# Patient Record
Sex: MALE | Race: WHITE | NOT HISPANIC OR LATINO | URBAN - METROPOLITAN AREA
[De-identification: names, ages, dates, MRNs, and addresses within clinical notes are randomized per-mention and may not be internally consistent; named-entity substitution may affect disease eponyms.]

---

## 2019-12-20 PROBLEM — Z00.00 ENCOUNTER FOR PREVENTIVE HEALTH EXAMINATION: Status: ACTIVE | Noted: 2019-12-20

## 2021-08-25 ENCOUNTER — OUTPATIENT (OUTPATIENT)
Dept: OUTPATIENT SERVICES | Facility: HOSPITAL | Age: 61
LOS: 1 days | Discharge: HOME | End: 2021-08-25
Payer: COMMERCIAL

## 2021-08-25 ENCOUNTER — RESULT REVIEW (OUTPATIENT)
Age: 61
End: 2021-08-25

## 2021-08-25 VITALS
OXYGEN SATURATION: 100 % | RESPIRATION RATE: 16 BRPM | DIASTOLIC BLOOD PRESSURE: 64 MMHG | HEIGHT: 70 IN | TEMPERATURE: 96 F | HEART RATE: 71 BPM | SYSTOLIC BLOOD PRESSURE: 110 MMHG | WEIGHT: 239.2 LBS

## 2021-08-25 DIAGNOSIS — Z98.890 OTHER SPECIFIED POSTPROCEDURAL STATES: Chronic | ICD-10-CM

## 2021-08-25 DIAGNOSIS — S76.111D STRAIN OF RIGHT QUADRICEPS MUSCLE, FASCIA AND TENDON, SUBSEQUENT ENCOUNTER: ICD-10-CM

## 2021-08-25 DIAGNOSIS — Z01.818 ENCOUNTER FOR OTHER PREPROCEDURAL EXAMINATION: ICD-10-CM

## 2021-08-25 LAB
A1C WITH ESTIMATED AVERAGE GLUCOSE RESULT: 7.4 % — HIGH (ref 4–5.6)
ALBUMIN SERPL ELPH-MCNC: 4.6 G/DL — SIGNIFICANT CHANGE UP (ref 3.5–5.2)
ALP SERPL-CCNC: 73 U/L — SIGNIFICANT CHANGE UP (ref 30–115)
ALT FLD-CCNC: 41 U/L — SIGNIFICANT CHANGE UP (ref 0–41)
ANION GAP SERPL CALC-SCNC: 14 MMOL/L — SIGNIFICANT CHANGE UP (ref 7–14)
APTT BLD: 30.5 SEC — SIGNIFICANT CHANGE UP (ref 27–39.2)
AST SERPL-CCNC: 33 U/L — SIGNIFICANT CHANGE UP (ref 0–41)
BASOPHILS # BLD AUTO: 0.06 K/UL — SIGNIFICANT CHANGE UP (ref 0–0.2)
BASOPHILS NFR BLD AUTO: 0.7 % — SIGNIFICANT CHANGE UP (ref 0–1)
BILIRUB SERPL-MCNC: 1.1 MG/DL — SIGNIFICANT CHANGE UP (ref 0.2–1.2)
BUN SERPL-MCNC: 19 MG/DL — SIGNIFICANT CHANGE UP (ref 10–20)
CALCIUM SERPL-MCNC: 9.4 MG/DL — SIGNIFICANT CHANGE UP (ref 8.5–10.1)
CHLORIDE SERPL-SCNC: 101 MMOL/L — SIGNIFICANT CHANGE UP (ref 98–110)
CO2 SERPL-SCNC: 22 MMOL/L — SIGNIFICANT CHANGE UP (ref 17–32)
CREAT SERPL-MCNC: 0.8 MG/DL — SIGNIFICANT CHANGE UP (ref 0.7–1.5)
EOSINOPHIL # BLD AUTO: 0.61 K/UL — SIGNIFICANT CHANGE UP (ref 0–0.7)
EOSINOPHIL NFR BLD AUTO: 7.1 % — SIGNIFICANT CHANGE UP (ref 0–8)
ESTIMATED AVERAGE GLUCOSE: 166 MG/DL — HIGH (ref 68–114)
GLUCOSE SERPL-MCNC: 127 MG/DL — HIGH (ref 70–99)
HCT VFR BLD CALC: 39.9 % — LOW (ref 42–52)
HGB BLD-MCNC: 12.6 G/DL — LOW (ref 14–18)
IMM GRANULOCYTES NFR BLD AUTO: 0.6 % — HIGH (ref 0.1–0.3)
INR BLD: 1 RATIO — SIGNIFICANT CHANGE UP (ref 0.65–1.3)
LYMPHOCYTES # BLD AUTO: 1.74 K/UL — SIGNIFICANT CHANGE UP (ref 1.2–3.4)
LYMPHOCYTES # BLD AUTO: 20.2 % — LOW (ref 20.5–51.1)
MCHC RBC-ENTMCNC: 22 PG — LOW (ref 27–31)
MCHC RBC-ENTMCNC: 31.6 G/DL — LOW (ref 32–37)
MCV RBC AUTO: 69.8 FL — LOW (ref 80–94)
MONOCYTES # BLD AUTO: 0.9 K/UL — HIGH (ref 0.1–0.6)
MONOCYTES NFR BLD AUTO: 10.4 % — HIGH (ref 1.7–9.3)
NEUTROPHILS # BLD AUTO: 5.26 K/UL — SIGNIFICANT CHANGE UP (ref 1.4–6.5)
NEUTROPHILS NFR BLD AUTO: 61 % — SIGNIFICANT CHANGE UP (ref 42.2–75.2)
NRBC # BLD: 0 /100 WBCS — SIGNIFICANT CHANGE UP (ref 0–0)
PLATELET # BLD AUTO: 284 K/UL — SIGNIFICANT CHANGE UP (ref 130–400)
POTASSIUM SERPL-MCNC: 4.5 MMOL/L — SIGNIFICANT CHANGE UP (ref 3.5–5)
POTASSIUM SERPL-SCNC: 4.5 MMOL/L — SIGNIFICANT CHANGE UP (ref 3.5–5)
PROT SERPL-MCNC: 7.3 G/DL — SIGNIFICANT CHANGE UP (ref 6–8)
PROTHROM AB SERPL-ACNC: 11.5 SEC — SIGNIFICANT CHANGE UP (ref 9.95–12.87)
RBC # BLD: 5.72 M/UL — SIGNIFICANT CHANGE UP (ref 4.7–6.1)
RBC # FLD: 17.7 % — HIGH (ref 11.5–14.5)
SODIUM SERPL-SCNC: 137 MMOL/L — SIGNIFICANT CHANGE UP (ref 135–146)
WBC # BLD: 8.62 K/UL — SIGNIFICANT CHANGE UP (ref 4.8–10.8)
WBC # FLD AUTO: 8.62 K/UL — SIGNIFICANT CHANGE UP (ref 4.8–10.8)

## 2021-08-25 PROCEDURE — 93010 ELECTROCARDIOGRAM REPORT: CPT

## 2021-08-25 PROCEDURE — 71046 X-RAY EXAM CHEST 2 VIEWS: CPT | Mod: 26

## 2021-08-25 NOTE — H&P PST ADULT - NSICDXPASTMEDICALHX_GEN_ALL_CORE_FT
PAST MEDICAL HISTORY:  Asthma     DM (diabetes mellitus)     H/O Bell's palsy     Hyperlipidemia     Hypertension     Non-Hodgkin lymphoma 13 yrs remission    Rupture, tendon, quadriceps

## 2021-08-25 NOTE — H&P PST ADULT - HISTORY OF PRESENT ILLNESS
62 y/o male presents to PAST in preparation for right quadricepts tendon repair medial and lateral capsular repair with Dr. Mcclendon in [facility] under [place] on 8/31/21    Pt states that she he had slipped waking out of his truck on the running board and injured his right leg. Pt had X-ray and MRI that showed a right quadricept tendon tear. Pt currently has a brace on right leg. Pt states that pain is 4/10, but has difficulty walking or putting any pressure on right leg. Pt wishes to have above procedure.  Pt states that he has a hx of asthma, last exacerbations was 6 months ago when used a rescue inhaler due to seasonal allergies.   PATIENT CURRENTLY DENIES CHEST PAIN  SHORTNESS OF BREATH  PALPITATIONS,  DYSURIA, OR UPPER RESPIRATORY INFECTION IN PAST 2 WEEKS  EXERCISE  TOLERANCE: pt walks 3 miles daily  pt denies any covid s/s, or tested positive in the past, vaccinated 3/21 pfizer  pt advised self quarantine till day of procedure  Patient verbalized understanding of instructions and was given the opportunity to ask questions and have them answered.  As per patient, this is their complete medical and surgical history, including medications both prescribed or over the counter.  written and verbal instructions with teach back on chlorhexidine shampoo provided,  pt verbalized understanding with returned demonstration    Anesthesia Alert  NO--Difficult Airway  NO--History of neck surgery or radiation  NO--Limited ROM of neck  NO--History of Malignant hyperthermia  NO--Personal or family history of Pseudocholinesterase deficiency.  NO--Prior Anesthesia Complication  NO--Latex Allergy  NO--Loose teeth  NO--History of Rheumatoid Arthritis  NO--RIKY  NO--Bleeding risk  NO--Other_____    S76.111A / [id number], 95594, 18934, [id number]    ^S76.111A / 41585, 48264, 09317, 66114

## 2021-08-25 NOTE — H&P PST ADULT - REASON FOR ADMISSION
60 y/o male presents to PAST in preparation for right quadricepts tendon repair medial and lateral capsular repair with Dr. Mcclendon in [facility] under [place] on 8/31/21

## 2021-08-28 ENCOUNTER — OUTPATIENT (OUTPATIENT)
Dept: OUTPATIENT SERVICES | Facility: HOSPITAL | Age: 61
LOS: 1 days | Discharge: HOME | End: 2021-08-28

## 2021-08-28 DIAGNOSIS — Z98.890 OTHER SPECIFIED POSTPROCEDURAL STATES: Chronic | ICD-10-CM

## 2021-08-28 DIAGNOSIS — Z11.59 ENCOUNTER FOR SCREENING FOR OTHER VIRAL DISEASES: ICD-10-CM

## 2021-08-28 PROBLEM — Z86.69 PERSONAL HISTORY OF OTHER DISEASES OF THE NERVOUS SYSTEM AND SENSE ORGANS: Chronic | Status: ACTIVE | Noted: 2021-08-25

## 2021-08-28 PROBLEM — C85.90 NON-HODGKIN LYMPHOMA, UNSPECIFIED, UNSPECIFIED SITE: Chronic | Status: ACTIVE | Noted: 2021-08-25

## 2021-08-28 PROBLEM — S76.119A STRAIN OF UNSPECIFIED QUADRICEPS MUSCLE, FASCIA AND TENDON, INITIAL ENCOUNTER: Chronic | Status: ACTIVE | Noted: 2021-08-25

## 2021-08-28 PROBLEM — I10 ESSENTIAL (PRIMARY) HYPERTENSION: Chronic | Status: ACTIVE | Noted: 2021-08-25

## 2021-08-28 PROBLEM — E11.9 TYPE 2 DIABETES MELLITUS WITHOUT COMPLICATIONS: Chronic | Status: ACTIVE | Noted: 2021-08-25

## 2021-08-28 PROBLEM — J45.909 UNSPECIFIED ASTHMA, UNCOMPLICATED: Chronic | Status: ACTIVE | Noted: 2021-08-25

## 2021-08-28 PROBLEM — E78.5 HYPERLIPIDEMIA, UNSPECIFIED: Chronic | Status: ACTIVE | Noted: 2021-08-25

## 2021-08-31 ENCOUNTER — OUTPATIENT (OUTPATIENT)
Dept: OUTPATIENT SERVICES | Facility: HOSPITAL | Age: 61
LOS: 1 days | Discharge: HOME | End: 2021-08-31

## 2021-08-31 VITALS
DIASTOLIC BLOOD PRESSURE: 75 MMHG | RESPIRATION RATE: 17 BRPM | HEART RATE: 81 BPM | OXYGEN SATURATION: 98 % | SYSTOLIC BLOOD PRESSURE: 129 MMHG

## 2021-08-31 VITALS
HEIGHT: 70 IN | OXYGEN SATURATION: 97 % | DIASTOLIC BLOOD PRESSURE: 74 MMHG | HEART RATE: 86 BPM | TEMPERATURE: 99 F | WEIGHT: 239.2 LBS | SYSTOLIC BLOOD PRESSURE: 129 MMHG | RESPIRATION RATE: 20 BRPM

## 2021-08-31 DIAGNOSIS — S76.119A STRAIN OF UNSPECIFIED QUADRICEPS MUSCLE, FASCIA AND TENDON, INITIAL ENCOUNTER: ICD-10-CM

## 2021-08-31 DIAGNOSIS — Z98.890 OTHER SPECIFIED POSTPROCEDURAL STATES: Chronic | ICD-10-CM

## 2021-08-31 LAB — GLUCOSE BLDC GLUCOMTR-MCNC: 141 MG/DL — HIGH (ref 70–99)

## 2021-08-31 RX ORDER — SIMVASTATIN 20 MG/1
1 TABLET, FILM COATED ORAL
Qty: 0 | Refills: 0 | DISCHARGE

## 2021-08-31 RX ORDER — MONTELUKAST 4 MG/1
1 TABLET, CHEWABLE ORAL
Qty: 0 | Refills: 0 | DISCHARGE

## 2021-08-31 RX ORDER — BUDESONIDE AND FORMOTEROL FUMARATE DIHYDRATE 160; 4.5 UG/1; UG/1
2 AEROSOL RESPIRATORY (INHALATION)
Qty: 0 | Refills: 0 | DISCHARGE

## 2021-08-31 RX ORDER — LISINOPRIL 2.5 MG/1
1 TABLET ORAL
Qty: 0 | Refills: 0 | DISCHARGE

## 2021-08-31 RX ORDER — ASPIRIN/CALCIUM CARB/MAGNESIUM 324 MG
1 TABLET ORAL
Qty: 0 | Refills: 0 | DISCHARGE

## 2021-08-31 RX ORDER — SITAGLIPTIN AND METFORMIN HYDROCHLORIDE 500; 50 MG/1; MG/1
1 TABLET, FILM COATED ORAL
Qty: 0 | Refills: 0 | DISCHARGE

## 2021-08-31 RX ORDER — SODIUM CHLORIDE 9 MG/ML
1000 INJECTION, SOLUTION INTRAVENOUS
Refills: 0 | Status: DISCONTINUED | OUTPATIENT
Start: 2021-08-31 | End: 2021-09-14

## 2021-08-31 RX ORDER — DEXAMETHASONE 0.5 MG/5ML
8 ELIXIR ORAL ONCE
Refills: 0 | Status: DISCONTINUED | OUTPATIENT
Start: 2021-08-31 | End: 2021-09-14

## 2021-08-31 RX ORDER — OMEPRAZOLE 10 MG/1
1 CAPSULE, DELAYED RELEASE ORAL
Qty: 0 | Refills: 0 | DISCHARGE

## 2021-08-31 RX ORDER — HYDROMORPHONE HYDROCHLORIDE 2 MG/ML
0.5 INJECTION INTRAMUSCULAR; INTRAVENOUS; SUBCUTANEOUS
Refills: 0 | Status: DISCONTINUED | OUTPATIENT
Start: 2021-08-31 | End: 2021-08-31

## 2021-08-31 RX ORDER — ALPRAZOLAM 0.25 MG
1 TABLET ORAL
Qty: 0 | Refills: 0 | DISCHARGE

## 2021-08-31 RX ORDER — CANAGLIFLOZIN 100 MG/1
1 TABLET, FILM COATED ORAL
Qty: 0 | Refills: 0 | DISCHARGE

## 2021-08-31 RX ORDER — METOCLOPRAMIDE HCL 10 MG
10 TABLET ORAL ONCE
Refills: 0 | Status: DISCONTINUED | OUTPATIENT
Start: 2021-08-31 | End: 2021-09-14

## 2021-08-31 RX ORDER — ONDANSETRON 8 MG/1
4 TABLET, FILM COATED ORAL ONCE
Refills: 0 | Status: DISCONTINUED | OUTPATIENT
Start: 2021-08-31 | End: 2021-09-14

## 2021-08-31 RX ADMIN — SODIUM CHLORIDE 100 MILLILITER(S): 9 INJECTION, SOLUTION INTRAVENOUS at 12:58

## 2021-08-31 NOTE — BRIEF OPERATIVE NOTE - NSICDXBRIEFPROCEDURE_GEN_ALL_CORE_FT
PROCEDURES:  Repair of ruptured right quadriceps tendon 31-Aug-2021 13:01:30  Dion Mcclendon  Repair of joint capsule of knee 31-Aug-2021 13:03:48  Dion Mcclendon

## 2021-08-31 NOTE — CHART NOTE - NSCHARTNOTEFT_GEN_A_CORE
PACU ANESTHESIA ADMISSION NOTE      Procedure: Repair of ruptured right quadriceps tendon      Post op diagnosis:  Rupture, tendon, quadriceps        ____  Intubated  TV:______       Rate: ______      FiO2: ______    _x___  Patent Airway    _x___  Full return of protective reflexes    _x___  Full recovery from anesthesia / back to baseline status    Vitals:  T(F): 97.4   HR: 87  BP: 161/79  RR: 14  SpO2: 100%    Mental Status:  _x___ Awake   _x____ Alert   _____ Drowsy   _____ Sedated    Nausea/Vomiting:  _x___  NO       ______Yes,   See Post - Op Orders         Pain Scale (0-10):  __0___    Treatment: _x___ None    ____ See Post - Op/PCA Orders    Post - Operative Fluids:   __x__ Oral   ____ See Post - Op Orders    Plan: Discharge:   _x___Home       _____Floor     _____Critical Care    _____  Other:_________________    Comments:  No anesthesia issues or complications noted.  Discharge when criteria met.

## 2021-09-05 DIAGNOSIS — S83.8X1A SPRAIN OF OTHER SPECIFIED PARTS OF RIGHT KNEE, INITIAL ENCOUNTER: ICD-10-CM
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
## 2021-09-05 DIAGNOSIS — S76.111A STRAIN OF RIGHT QUADRICEPS MUSCLE, FASCIA AND TENDON, INITIAL ENCOUNTER: ICD-10-CM
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
## 2022-05-26 NOTE — ASU PREOP CHECKLIST - NOTHING BY MOUTH SINCE
Pt requesting drainage bag that does not have the meter box. Unable to locate one here in the hospital, instructed pt on use of leg bag since she is ambulatory and states she has pulled out the godoy multiple times. Pt states that the leg bag fills up too fast and she has to empty it many times and she would prefer the standard drainage bag. Provider aware. 30-Aug-2021 23:00

## 2022-06-22 ENCOUNTER — NON-APPOINTMENT (OUTPATIENT)
Age: 62
End: 2022-06-22

## 2022-06-29 ENCOUNTER — NON-APPOINTMENT (OUTPATIENT)
Age: 62
End: 2022-06-29

## 2022-07-05 ENCOUNTER — RX RENEWAL (OUTPATIENT)
Age: 62
End: 2022-07-05

## 2022-07-07 ENCOUNTER — NON-APPOINTMENT (OUTPATIENT)
Age: 62
End: 2022-07-07

## 2022-07-12 ENCOUNTER — APPOINTMENT (OUTPATIENT)
Dept: ORTHOPEDIC SURGERY | Facility: CLINIC | Age: 62
End: 2022-07-12

## 2022-07-25 ENCOUNTER — APPOINTMENT (OUTPATIENT)
Dept: ORTHOPEDIC SURGERY | Facility: CLINIC | Age: 62
End: 2022-07-25

## 2022-07-27 ENCOUNTER — APPOINTMENT (OUTPATIENT)
Dept: ORTHOPEDIC SURGERY | Facility: CLINIC | Age: 62
End: 2022-07-27

## 2022-07-27 PROCEDURE — 99213 OFFICE O/P EST LOW 20 MIN: CPT

## 2022-07-27 RX ORDER — SITAGLIPTIN AND METFORMIN HYDROCHLORIDE 50; 1000 MG/1; MG/1
50-1000 TABLET, FILM COATED ORAL
Qty: 180 | Refills: 0 | Status: ACTIVE | COMMUNITY
Start: 2022-01-24

## 2022-07-27 RX ORDER — PREDNISONE 20 MG/1
20 TABLET ORAL
Qty: 10 | Refills: 0 | Status: ACTIVE | COMMUNITY
Start: 2022-07-21

## 2022-07-27 RX ORDER — AMOXICILLIN AND CLAVULANATE POTASSIUM 500; 125 MG/1; MG/1
500-125 TABLET, FILM COATED ORAL
Qty: 14 | Refills: 0 | Status: ACTIVE | COMMUNITY
Start: 2022-07-21

## 2022-07-27 RX ORDER — BUDESONIDE AND FORMOTEROL FUMARATE DIHYDRATE 160; 4.5 UG/1; UG/1
160-4.5 AEROSOL RESPIRATORY (INHALATION)
Qty: 31 | Refills: 0 | Status: ACTIVE | COMMUNITY
Start: 2022-03-17

## 2022-07-27 RX ORDER — ALBUTEROL SULFATE 2.5 MG/3ML
(2.5 MG/3ML) SOLUTION RESPIRATORY (INHALATION)
Qty: 75 | Refills: 0 | Status: ACTIVE | COMMUNITY
Start: 2022-07-21

## 2022-07-27 RX ORDER — FAMOTIDINE 40 MG/1
40 TABLET, FILM COATED ORAL
Qty: 30 | Refills: 0 | Status: ACTIVE | COMMUNITY
Start: 2022-03-30

## 2022-07-27 RX ORDER — CETIRIZINE HYDROCHLORIDE 10 MG/1
10 TABLET, COATED ORAL
Qty: 30 | Refills: 0 | Status: ACTIVE | COMMUNITY
Start: 2021-12-22

## 2022-07-27 RX ORDER — MONTELUKAST 10 MG/1
10 TABLET, FILM COATED ORAL
Qty: 90 | Refills: 0 | Status: ACTIVE | COMMUNITY
Start: 2021-05-25

## 2022-07-27 RX ORDER — OMEPRAZOLE 40 MG/1
40 CAPSULE, DELAYED RELEASE ORAL
Qty: 30 | Refills: 0 | Status: ACTIVE | COMMUNITY
Start: 2022-03-30

## 2022-07-27 RX ORDER — CANAGLIFLOZIN 100 MG/1
100 TABLET, FILM COATED ORAL
Qty: 90 | Refills: 0 | Status: ACTIVE | COMMUNITY
Start: 2022-01-24

## 2022-07-27 RX ORDER — LISINOPRIL 20 MG/1
20 TABLET ORAL
Qty: 90 | Refills: 0 | Status: ACTIVE | COMMUNITY
Start: 2021-11-05

## 2022-07-27 RX ORDER — SIMVASTATIN 40 MG/1
40 TABLET, FILM COATED ORAL
Qty: 90 | Refills: 0 | Status: ACTIVE | COMMUNITY
Start: 2022-04-10

## 2022-07-27 RX ORDER — ALBUTEROL SULFATE 90 UG/1
108 (90 BASE) INHALANT RESPIRATORY (INHALATION)
Qty: 7 | Refills: 0 | Status: ACTIVE | COMMUNITY
Start: 2022-03-15

## 2022-07-27 RX ORDER — FLUCONAZOLE 150 MG/1
150 TABLET ORAL
Qty: 2 | Refills: 0 | Status: ACTIVE | COMMUNITY
Start: 2022-06-20

## 2022-07-27 RX ORDER — GLIPIZIDE 10 MG/1
10 TABLET, FILM COATED, EXTENDED RELEASE ORAL
Qty: 90 | Refills: 0 | Status: ACTIVE | COMMUNITY
Start: 2021-12-31

## 2022-07-27 RX ORDER — LEVOCETIRIZINE DIHYDROCHLORIDE 5 MG/1
5 TABLET ORAL
Qty: 30 | Refills: 0 | Status: ACTIVE | COMMUNITY
Start: 2022-03-30

## 2022-07-27 RX ORDER — ALPRAZOLAM 2 MG/1
2 TABLET ORAL
Qty: 90 | Refills: 0 | Status: ACTIVE | COMMUNITY
Start: 2022-05-05

## 2022-07-27 NOTE — DISCUSSION/SUMMARY
[de-identified] :   This point I recommend MRI of the pelvis to evaluate for a left proximal hamstring rupture.  I recommend formal therapy for the left shoulder, Rx provided for that.  I recommend he continues therapy for his right knee and start therapy for the left proximal hamstring.  He will call me 2 days after the MRI of the pelvis is performed so we can discuss results, I will see him in 6 weeks for further evaluation.  I did discuss to him that I would recommend surgical fixation for the full-thickness rotator cuff tear of the left shoulder however he would like to hold off on that for now.  I did explain to him that these tears can large over time and the surgery can be more difficult to perform later on down the road.\par \par   Supervising physician:  Dr. Mcclendon

## 2022-07-27 NOTE — PHYSICAL EXAM
[Left] : left hip [Right] : right knee [NL (0)] : extension 0 degrees [de-identified] : Some weakness with rotator cuff resistance testing. [TWNoteComboBox4] : passive forward flexion 170 degrees [TWNoteComboBox6] : internal rotation L1 [FreeTextEntry8] : Physical exam of the left proximal hamstring:  He has tenderness to palpation over the left proximal hamstring. [FreeTextEntry9] :   Decreased range of motion with left hip flexion , full extension. [de-identified] :   Decreased strength with resisted left knee flexion compared to the right. [] : non-antalgic [TWNoteComboBox7] : flexion 130 degrees

## 2022-07-27 NOTE — HISTORY OF PRESENT ILLNESS
[de-identified] : The patient is a 62-year-old male here for subsequent re-evaluation of his right knee, left shoulder, left proximal hamstring.  Regarding his right knee, he is status post right quadriceps tendon repair with medial and lateral capsular repair on 08/31/2021.  He is almost 11 months out from his surgery and his knee was doing very well until recently when he began developing pain in left proximal hamstring.  No trauma to the left proximal hamstring.  Regarding his left shoulder he developed pain after working out with weights in late March 2022.  He has an MRI left shoulder that shows a full-thickness rotator cuff tear.  He has not had any formal therapy for the left shoulder and the left proximal hamstring.

## 2022-08-03 ENCOUNTER — APPOINTMENT (OUTPATIENT)
Dept: MRI IMAGING | Facility: CLINIC | Age: 62
End: 2022-08-03

## 2022-08-03 PROCEDURE — 72195 MRI PELVIS W/O DYE: CPT

## 2022-08-06 ENCOUNTER — RX RENEWAL (OUTPATIENT)
Age: 62
End: 2022-08-06

## 2022-08-15 ENCOUNTER — APPOINTMENT (OUTPATIENT)
Dept: ORTHOPEDIC SURGERY | Facility: CLINIC | Age: 62
End: 2022-08-15

## 2022-08-19 ENCOUNTER — APPOINTMENT (OUTPATIENT)
Dept: ORTHOPEDIC SURGERY | Facility: CLINIC | Age: 62
End: 2022-08-19

## 2022-08-19 DIAGNOSIS — M16.10 UNILATERAL PRIMARY OSTEOARTHRITIS, UNSPECIFIED HIP: ICD-10-CM

## 2022-08-19 DIAGNOSIS — M25.512 PAIN IN LEFT SHOULDER: ICD-10-CM

## 2022-08-19 PROCEDURE — 99213 OFFICE O/P EST LOW 20 MIN: CPT

## 2022-08-19 PROCEDURE — 73522 X-RAY EXAM HIPS BI 3-4 VIEWS: CPT

## 2022-08-19 NOTE — IMAGING
[de-identified] :  good neck motion no spasm\par \par Left shoulder mild impingement some crepitus good strength \par \par Right knee healed incision good motion mild weakness normal gait\par \par Left hip some pain at the ischium some pain with stretch back has some spasm no focal neurologic deficits\par \par X-rays taken today show mild arthritic changes of the hips left more than right

## 2022-08-19 NOTE — ASSESSMENT
[FreeTextEntry1] :  I think he has done quite well from the knee surgery will going to focus our efforts on the shoulder with therapy and the hamstring I do not think there is much arthritis to speak of in the hip joint is good that he has lost some weight I will see him in 3 months he can call me in a month if the hamstring symptoms on improved I might consider pain management and a cortisone injection

## 2022-08-19 NOTE — REASON FOR VISIT
[FreeTextEntry2] : follow up for left shoulder and left hamstring\par About a year now from his right quad tendon rupture repair doing well quite good motion therapy has ended doing work on his own he has lost 76 lb been having some trouble with his left non dominant shoulder and left hamstring has MRI of the left shoulder and the left hip supposed to start therapy on both this week

## 2022-08-19 NOTE — DATA REVIEWED
[Pelvis] : pelvis [I reviewed the films/CD and agree] : I reviewed the films/CD and agree [MRI] : MRI [Shoulder] : shoulder [Report was reviewed and noted in the chart] : The report was reviewed and noted in the chart [FreeTextEntry1] :  MRI pelvis 08/03/2022:1. Moderate grade partial tearing at the left hamstring tendon insertion without retraction with moderate surrounding bursitis.\par 2. Degenerative changes in the lower lumbar spine which appears convex towards the left, chronic appearing mild bilateral \par sacroiliac joint arthrosis, mild-to-moderate bilateral hip arthrosis, symphysis pubis hypertrophy, right hamstring insertional \par tendinopathy, and bilateral gluteal tendinopathy.\par 3. Enlarged prostate measuring 3-4 cm which should be correlated clinically. Consider ultrasound of the prostate to further \par evaluate as clinically indicated. [FreeTextEntry2] :   MRI left shoulder 06/28/2022: Full-thickness rotator cuff tear no significant retraction stable frame of posterior labrum

## 2022-09-10 ENCOUNTER — RX RENEWAL (OUTPATIENT)
Age: 62
End: 2022-09-10

## 2022-09-13 ENCOUNTER — APPOINTMENT (OUTPATIENT)
Dept: ORTHOPEDIC SURGERY | Facility: CLINIC | Age: 62
End: 2022-09-13

## 2022-09-14 ENCOUNTER — APPOINTMENT (OUTPATIENT)
Dept: ORTHOPEDIC SURGERY | Facility: CLINIC | Age: 62
End: 2022-09-14

## 2022-09-14 PROCEDURE — 99213 OFFICE O/P EST LOW 20 MIN: CPT

## 2022-09-14 NOTE — PHYSICAL EXAM
[Right] : right knee [NL (0)] : extension 0 degrees [] : patient ambulates without assistive device [FreeTextEntry3] :   Surgical scar noted in the midline [TWNoteComboBox7] : flexion 125 degrees

## 2022-09-14 NOTE — DISCUSSION/SUMMARY
[de-identified] :  At this point I recommend he continues formal physical therapy, new Rx provided for that.  I will see him back in 6 weeks for further evaluation.  If his symptoms worsen prior to that he will call me sooner.\par \par Advising physician:  Dr. Mcclendon

## 2022-09-14 NOTE — HISTORY OF PRESENT ILLNESS
[de-identified] : The patient is a 62-year-old male here for subsequent re-evaluation of his right knee.  He is status post quadriceps tendon repair as well as medial and lateral capsular repair on 08/31/2021.  He is getting some pain in the knee, he actually points over the medial tibial plateau as to where the pain is.  That is starting to improve.  He is doing formal physical therapy.  He states that Dr. Gorman corrected his gait pattern on his previous visit here with him.

## 2022-09-28 ENCOUNTER — APPOINTMENT (OUTPATIENT)
Dept: ORTHOPEDIC SURGERY | Facility: CLINIC | Age: 62
End: 2022-09-28

## 2022-10-11 ENCOUNTER — APPOINTMENT (OUTPATIENT)
Dept: PAIN MANAGEMENT | Facility: CLINIC | Age: 62
End: 2022-10-11

## 2022-10-11 VITALS — SYSTOLIC BLOOD PRESSURE: 139 MMHG | HEART RATE: 82 BPM | DIASTOLIC BLOOD PRESSURE: 83 MMHG

## 2022-10-11 VITALS — WEIGHT: 228 LBS | HEIGHT: 70 IN | BODY MASS INDEX: 32.64 KG/M2

## 2022-10-11 PROCEDURE — 99203 OFFICE O/P NEW LOW 30 MIN: CPT

## 2022-10-12 ENCOUNTER — APPOINTMENT (OUTPATIENT)
Dept: PAIN MANAGEMENT | Facility: CLINIC | Age: 62
End: 2022-10-12

## 2022-10-12 ENCOUNTER — RX RENEWAL (OUTPATIENT)
Age: 62
End: 2022-10-12

## 2022-10-12 PROCEDURE — 76942 ECHO GUIDE FOR BIOPSY: CPT

## 2022-10-12 PROCEDURE — 20552 NJX 1/MLT TRIGGER POINT 1/2: CPT

## 2022-10-12 RX ORDER — NAPROXEN 500 MG/1
500 TABLET ORAL
Qty: 60 | Refills: 0 | Status: ACTIVE | COMMUNITY
Start: 2022-07-05 | End: 1900-01-01

## 2022-10-12 NOTE — HISTORY OF PRESENT ILLNESS
[FreeTextEntry1] : HISTORY OF PRESENT ILLNESS: This is a 62 year old man complaining of left hamstring pain which extends up into the hip. The patient has had this pain for 4 months. The pain has worsened in the last 2 months. The pain started after an injury that took place while exercising. Patient describes pain as moderate to severe. During the last month the pain has been nearly constant with symptoms worsening in no typical pattern. Pain described as sharp, dull/aching, and throbbing.  Pain is not associated with numbness or weakness.  Pain is increased with sitting, walking and exercise.  Pain is decreased with lying down, standing in relaxation. Pain is not changed with coughing/sneezing and bowel movements. Bowel or bladder habits have not changed.\par \par Of note, patient was previously under the care of Dr. Gorman who referred him to pain management for consideration of a left hamstring injection. Patient has trialed a month a a half of physical therapy with mild relief. \par  \par ACTIVITIES: Patient could walk 10 blocks before the pain starts. Patient can sit 1-2 hours  before pain starts. Patient can stand 1 hour before pain starts. The patient seldom lays down because of pain. Patient uses no assistive walking device at this time. Patient has difficulty doing yard work or shopping, socializing with friends, participating in recreational activities and exercising at this time.\par \par PRIOR PAIN TREATMENTS:  Moderate relief with physical therapy, exercise, heat treatment, cold treatment and acupuncture\par \par PRIOR PAIN MEDICATIONS:  Naproxen and Xanax\par

## 2022-10-12 NOTE — PHYSICAL EXAM
[de-identified] : GENERAL APPEARANCE OF PATIENT IS WELL DEVELOPED, WELL NOURISHED, BODY HABITUS NORMAL, WELL GROOMED, NO DEFORMITIES NOTED. \par Head - Atraumatic and Normocephalic \par Eyes, Nose, and Throat: External inspection of ears and nose are normal overall without scars, lesions, or masses noted. Assessment of hearing is normal\par Neck-Examination of neck shows no masses, overall appearance is normal, neck is symmetric, tracheal position is midline, no crepitus is noted. Examination of thyroid shows no enlargement, tenderness or masses\par Respiratory- Assessment of respiratory effort shows no intercostal retractions, no use of accessory muscles, unlabored breathing, and normal diaphragmatic movement.\par Cardiovascular- Examination of extremities show no edema or varicosities\par Musculoskeletal. Examination of gait is not antalgic and station is normal\par Inspection and palpation of digits and nails shows no clubbing, cyanosis, nodules, drainage, fluctuance, petechiae\par \par • Spine – inspection and palpation shows no misalignment, asymmetry, crepitation, defects, tenderness, masses, effusions. ROM is normal without crepitation or contracture. No instability or subluxation or laxity is noted. No abnormal movements.\par \par \par • Neck- inspection and palpation shows no misalignment, asymmetry, crepitation, defects, tenderness, masses, effusions. ROM is normal without crepitation or contracture. No instability or subluxation or laxity is noted. No abnormal movements.\par \par \par • RUE- inspection and palpation shows no misalignment, asymmetry, crepitation, defects, tenderness, masses, effusions. ROM is normal without crepitation or contracture. No instability or subluxation or laxity is noted. No abnormal movements.\par \par \par • LUE- inspection and palpation shows no misalignment, asymmetry, crepitation, defects, tenderness, masses, effusions. ROM is normal without crepitation or contracture. No instability or subluxation or laxity is noted. No abnormal movements.\par \par \par • RLE- inspection and palpation shows no misalignment, asymmetry, crepitation, defects, tenderness, masses, effusions. ROM is normal without crepitation or contracture. No instability or subluxation or laxity is noted. No abnormal movements.\par \par \par • LLE- tenderness of the left hamstring to insertion at the pelvis. \par \par \par • Skin- Inspection of skin and subcutaneous tissue shows no rashes, lesions or ulcers. Palpation of skin and subcutaneous tissue shows no rashes, no indurations, subcutaneous nodules or tightening.\par \par \par • Abdomen- Nontender\par \par \par • Neurologic- CN 2-12 are grossly intact. No sensory or motor deficits in the upper and lower extremities. Adequate strength in upper and lower extremities \par \par \par • Psychiatric- Patient’s judgment and insight are intact. Oriented to time, place and person. Recent and remote memory intact.\par

## 2022-10-12 NOTE — DISCUSSION/SUMMARY
[de-identified] : Mr. Christopher Jett Is a 62-year-old male with a chief complaint of left hamstring pain.  He notes that pain refers up into the hip.  He has had this pain for about 4 months following an injury which took place while exercising.  He notes pain has worsened in the last 2 months.  At this time we will trial a left hamstring injection with fluoroscopic and ultrasound guidance.  Risk, benefits, pros and cons of procedure were explained to the patient using models and diagrams and their questions were answered.   Patient will follow up after the injection for re-evaluation.  All of this patient's questions were answered and he understood our conversation well. \par \par I, Nae Suazo, attest that this documentation has been prepared under the direction and in the presence of Provider Efrain Briones DO\par The documentation recorded by the scribe, in my presence, accurately reflects the service I personally performed, and the decisions made by me with my edits as appropriate.\par \par Best Regards, \par Efrain Briones D.O. \par Diplomat, American Board of Anesthesiology \par Diplomat, American Board of Pain Medicine \par Diplomat, American Board of Pain Management

## 2022-10-25 ENCOUNTER — APPOINTMENT (OUTPATIENT)
Dept: PAIN MANAGEMENT | Facility: CLINIC | Age: 62
End: 2022-10-25

## 2022-10-25 VITALS — BODY MASS INDEX: 32.64 KG/M2 | HEIGHT: 70 IN | WEIGHT: 228 LBS

## 2022-10-25 PROCEDURE — 99213 OFFICE O/P EST LOW 20 MIN: CPT

## 2022-10-27 NOTE — PHYSICAL EXAM
[de-identified] : GENERAL APPEARANCE OF PATIENT IS WELL DEVELOPED, WELL NOURISHED, BODY HABITUS NORMAL, WELL GROOMED, NO DEFORMITIES NOTED. \par Head - Atraumatic and Normocephalic \par Eyes, Nose, and Throat: External inspection of ears and nose are normal overall without scars, lesions, or masses noted. Assessment of hearing is normal\par Neck-Examination of neck shows no masses, overall appearance is normal, neck is symmetric, tracheal position is midline, no crepitus is noted. Examination of thyroid shows no enlargement, tenderness or masses\par Respiratory- Assessment of respiratory effort shows no intercostal retractions, no use of accessory muscles, unlabored breathing, and normal diaphragmatic movement.\par Cardiovascular- Examination of extremities show no edema or varicosities\par Musculoskeletal. Examination of gait is not antalgic and station is normal\par Inspection and palpation of digits and nails shows no clubbing, cyanosis, nodules, drainage, fluctuance, petechiae\par \par • Spine – inspection and palpation shows no misalignment, asymmetry, crepitation, defects, tenderness, masses, effusions. ROM is normal without crepitation or contracture. No instability or subluxation or laxity is noted. No abnormal movements.\par \par \par • Neck- inspection and palpation shows no misalignment, asymmetry, crepitation, defects, tenderness, masses, effusions. ROM is normal without crepitation or contracture. No instability or subluxation or laxity is noted. No abnormal movements.\par \par \par • RUE- inspection and palpation shows no misalignment, asymmetry, crepitation, defects, tenderness, masses, effusions. ROM is normal without crepitation or contracture. No instability or subluxation or laxity is noted. No abnormal movements.\par \par \par • LUE- inspection and palpation shows no misalignment, asymmetry, crepitation, defects, tenderness, masses, effusions. ROM is normal without crepitation or contracture. No instability or subluxation or laxity is noted. No abnormal movements.\par \par \par • RLE- inspection and palpation shows no misalignment, asymmetry, crepitation, defects, tenderness, masses, effusions. ROM is normal without crepitation or contracture. No instability or subluxation or laxity is noted. No abnormal movements.\par \par \par • LLE- tenderness of the left hamstring to insertion at the pelvis. \par \par \par • Skin- Inspection of skin and subcutaneous tissue shows no rashes, lesions or ulcers. Palpation of skin and subcutaneous tissue shows no rashes, no indurations, subcutaneous nodules or tightening.\par \par \par • Abdomen- Nontender\par \par \par • Neurologic- CN 2-12 are grossly intact. No sensory or motor deficits in the upper and lower extremities. Adequate strength in upper and lower extremities \par \par \par • Psychiatric- Patient’s judgment and insight are intact. Oriented to time, place and person. Recent and remote memory intact.\par

## 2022-10-27 NOTE — REASON FOR VISIT
[Follow-Up Visit] : a follow-up pain management visit [FreeTextEntry2] : POST INJECTION / ORDERED LT HAMSTRING INJ

## 2022-10-27 NOTE — HISTORY OF PRESENT ILLNESS
[FreeTextEntry1] : HISTORY OF PRESENT ILLNESS: This is a 62 year old man complaining of left hamstring pain which extends up into the hip. The patient has had this pain for 4 months. The pain has worsened in the last 2 months. The pain started after an injury that took place while exercising. Patient describes pain as moderate to severe. During the last month the pain has been nearly constant with symptoms worsening in no typical pattern. Pain described as sharp, dull/aching, and throbbing.  Pain is not associated with numbness or weakness.  Pain is increased with sitting, walking and exercise.  Pain is decreased with lying down, standing in relaxation. Pain is not changed with coughing/sneezing and bowel movements. Bowel or bladder habits have not changed.\par \par Of note, patient was previously under the care of Dr. Gorman who referred him to pain management for consideration of a left hamstring injection. Patient has trialed a month a a half of physical therapy with mild relief. \par  \par ACTIVITIES: Patient could walk 10 blocks before the pain starts. Patient can sit 1-2 hours  before pain starts. Patient can stand 1 hour before pain starts. The patient seldom lays down because of pain. Patient uses no assistive walking device at this time. Patient has difficulty doing yard work or shopping, socializing with friends, participating in recreational activities and exercising at this time.\par \par PRIOR PAIN TREATMENTS:  Moderate relief with physical therapy, exercise, heat treatment, cold treatment and acupuncture\par \par PRIOR PAIN MEDICATIONS:  Naproxen and Xanax\par \par PRESENTING TODAY: In revisit encounter in regard to his improved left hamstring pain. He status post left hamstring injection on 10/12/22. He states this procedure provided him with over 50% relief. He notes pain has improved greatly and he now experiences mild soreness. He is able to complete physical therapy and continue home exercise at this time. \par \par Covid 19 - This in-office encounter has occurred during a Public Health Emergency (PHE). As required by law, due to the risk of respiratory-transmitted infectious diseases, our office provided additional materials, supplies and clinical staff to assist in keeping our patients, physicians and office staff safe during this health emergency.\par

## 2022-10-27 NOTE — DISCUSSION/SUMMARY
[de-identified] : Mr. Christopher Jett Is a 62-year-old male with a chief complaint of left hamstring pain.  He notes that pain refers up into the hip.  He has had this pain for about 4 months following an injury which took place while exercising.  He notes pain has worsened in the last 2 months. At this time he is status post left hamstring injection with fluoroscopic and ultrasound guidance. He notes that this procedure provided him with over 50% relief. I advised the patient that he is a candidate for left hamstring trigger point injections, he will follow up in 4 weeks for TPI vs. follow up visit.  At a later date he can consider consider PRP injections for the left hamstring if no relief from the TPI. All of this patient's questions were answered and he understood our conversation well. \par \par I, Nae Suazo, attest that this documentation has been prepared under the direction and in the presence of Provider Efrain Briones DO\par The documentation recorded by the scribe, in my presence, accurately reflects the service I personally performed, and the decisions made by me with my edits as appropriate.\par \par Best Regards, \par Erfain Briones D.O. \par Diplomat, American Board of Anesthesiology \par Diplomat, American Board of Pain Medicine \par Diplomat, American Board of Pain Management

## 2022-11-15 ENCOUNTER — APPOINTMENT (OUTPATIENT)
Dept: PAIN MANAGEMENT | Facility: CLINIC | Age: 62
End: 2022-11-15

## 2022-11-15 VITALS — WEIGHT: 228 LBS | HEIGHT: 70 IN | BODY MASS INDEX: 32.64 KG/M2

## 2022-11-15 PROCEDURE — 76942 ECHO GUIDE FOR BIOPSY: CPT

## 2022-11-15 PROCEDURE — 20552 NJX 1/MLT TRIGGER POINT 1/2: CPT

## 2022-11-15 NOTE — PROCEDURE
[FreeTextEntry3] : Trigger Point Injection under Ultrasound Guidance\par \par Preoperative diagnosis: Trigger points/ Myalgia                      \par Postoperative diagnosis: Same  \par Procedure:  left hamstring Trigger point injections with ultrasound guidance.\par \par Trigger point injections, one or two muscles:\par \par Ultrasound Guidance:          \par                           \par Physician: Efrain Briones D.O.                                                                                             \par \par Indications for Ultrasound:  Accurate percutaneous needle placement requires image guidance to prevent neuro/vascular injury or intravascular injection, as well as to avoid lung injury.\par \par Medical Necessity:  Failure of conservative management\par \par Indication a palpable taut band of muscle with restriction of motion.  Noninvasive treatment modalities such as rest, heat/ice and stretching have been ineffective.  The pain has been present for greater than 3 months.\par \par CONSENT: The possible complications including infection, bleeding, nerve damage, pneumothorax (collapsed lung), hospital admission or failure of the procedure; though unusual, are theoretically possible. The patient was educated about the of the procedure and alternative therapies. All questions were answered and the patient freely gave consent to proceed.\par \par PROCEDURE NOTE:  After obtaining written consent, the patient was placed in the prone or sitting position. Areas of point tenderness in the muscles were identified and prepped with Hibiclens in a sterile fashion.   Using continuous ultrasound guidance for needle localization, multiple trigger points in the left hamstring were IDed. A mixture of 100cc of D5, 2cc of 2% lidocaine, with a .5cc of Bicarb 8.4 PH was made. Using  80cc of the mixture were injected into multiple trigger areas with a 27-gauge 1/2" needle using was used. All the needles were removed intact.  \par \par Ultrasound findings: No calcifications\par \par Complications: None. The patient tolerated the procedure well.\par \par Disposition: I have examined the patient and there are no new physical findings since the original presentation.  Sensory and motor function were intact. The patient met discharge criteria see nurses notes.\par \par Comment: 1st trigger point today, depending on effectiveness and insurance would repeat in 2 week or follow up in office in 1 week. Call if any problems.\par \par Efrain Briones D.O.\par Diplomat, American Board of Anesthesiology\par Diplomat, American Board of Pain Medicine\par Diplomat, American Board of Pain Management\par \par \par \par

## 2022-11-17 ENCOUNTER — APPOINTMENT (OUTPATIENT)
Dept: ORTHOPEDIC SURGERY | Facility: CLINIC | Age: 62
End: 2022-11-17

## 2022-11-17 PROCEDURE — 99213 OFFICE O/P EST LOW 20 MIN: CPT

## 2022-11-17 NOTE — REASON FOR VISIT
[FreeTextEntry2] : left shoulder and left hamstring  Shoulders feeling much better with therapy 2 and half months doing a lot of home exercise hamstring did not respond to cortisone but is now having some trigger point injections with Dr. Briones

## 2022-11-17 NOTE — IMAGING
[de-identified] :  good neck motion no spasm\par \par Left shoulder mild impingement some crepitus good strength \par \par Right knee healed incision good motion mild weakness normal gait\par \par Left hip some pain at the ischium some pain with stretch back has some spasm no focal neurologic deficits\par \par X-rays taken today show mild arthritic changes of the hips left more than right

## 2022-11-17 NOTE — ASSESSMENT
[FreeTextEntry1] :  I think he has done quite well from the knee surgery  efforts on the shoulder with therapy  having quite successful      the hamstring  has not responded yet to therapy or several injections I do not think there is much arthritis to speak of in the hip joint is good that he has lost some weight I will see him in 3 months he can call me in a month if the hamstring symptoms on improved I might consider  PRP injection

## 2022-11-22 ENCOUNTER — APPOINTMENT (OUTPATIENT)
Dept: PAIN MANAGEMENT | Facility: CLINIC | Age: 62
End: 2022-11-22

## 2022-11-28 ENCOUNTER — APPOINTMENT (OUTPATIENT)
Dept: ORTHOPEDIC SURGERY | Facility: CLINIC | Age: 62
End: 2022-11-28

## 2022-11-28 PROCEDURE — 99213 OFFICE O/P EST LOW 20 MIN: CPT

## 2022-11-28 RX ORDER — BETAMETHASONE DIPROPIONATE 0.5 MG/G
0.05 CREAM TOPICAL
Qty: 15 | Refills: 0 | Status: ACTIVE | COMMUNITY
Start: 2022-11-25

## 2022-11-28 NOTE — PHYSICAL EXAM
[Right] : right knee [NL (0)] : extension 0 degrees [] : non-antalgic [FreeTextEntry3] :   Surgical scar noted in the midline [TWNoteComboBox7] : flexion 130 degrees

## 2022-11-28 NOTE — DISCUSSION/SUMMARY
[de-identified] : At this point he will do home therapy exercises for the right knee.  I will see him back in 2 months for further evaluation of the right knee.\par \par Supervising physician:  Dr. Mcclendon

## 2022-11-28 NOTE — HISTORY OF PRESENT ILLNESS
[de-identified] : The patient is a 62-year-old male here for subsequent re-evaluation of his right knee.  He is status post quadriceps tendon repair as well as medial and lateral capsular repair on 08/31/2021.  He is getting some pain in the knee, he actually points over the medial tibial plateau as to where the pain is.  That is starting to improve.  He is doing formal physical therapy.  He states that Dr. Gorman corrected his gait pattern on his previous visit here with him.

## 2022-11-29 ENCOUNTER — APPOINTMENT (OUTPATIENT)
Dept: PAIN MANAGEMENT | Facility: CLINIC | Age: 62
End: 2022-11-29

## 2022-11-29 PROCEDURE — 76942 ECHO GUIDE FOR BIOPSY: CPT

## 2022-11-29 PROCEDURE — 20552 NJX 1/MLT TRIGGER POINT 1/2: CPT

## 2022-12-01 NOTE — PROCEDURE
[FreeTextEntry3] : Trigger Point Injection under Ultrasound Guidance\par \par Preoperative diagnosis: Trigger points/ Myalgia                      \par Postoperative diagnosis: Same  \par Procedure:  left hamstring Trigger point injections with ultrasound guidance.\par \par Trigger point injections, one or two muscles:\par \par Ultrasound Guidance:          \par                           \par Physician: Efrain Briones D.O.                                                                                             \par \par Indications for Ultrasound:  Accurate percutaneous needle placement requires image guidance to prevent neuro/vascular injury or intravascular injection, as well as to avoid lung injury.\par \par Medical Necessity:  Failure of conservative management\par \par Indication a palpable taut band of muscle with restriction of motion.  Noninvasive treatment modalities such as rest, heat/ice and stretching have been ineffective.  The pain has been present for greater than 3 months.\par \par CONSENT: The possible complications including infection, bleeding, nerve damage, pneumothorax (collapsed lung), hospital admission or failure of the procedure; though unusual, are theoretically possible. The patient was educated about the of the procedure and alternative therapies. All questions were answered and the patient freely gave consent to proceed.\par \par PROCEDURE NOTE:  After obtaining written consent, the patient was placed in the prone or sitting position. Areas of point tenderness in the muscles were identified and prepped with Hibiclens in a sterile fashion.   Using continuous ultrasound guidance for needle localization, multiple trigger points in the left hamstring were IDed. A mixture of 100cc of D5, 2cc of 2% lidocaine, with a .5cc of Bicarb 8.4 PH was made. Using  60cc of the mixture were injected into multiple trigger areas with a 27-gauge 1/2" needle using was used. All the needles were removed intact.  \par \par Ultrasound findings: No calcifications\par \par Complications: None. The patient tolerated the procedure well.\par \par Disposition: I have examined the patient and there are no new physical findings since the original presentation.  Sensory and motor function were intact. The patient met discharge criteria see nurses notes.\par \par Comment: Patient received about 20% relief from the 1st trigger point. 2nd TPI today, depending on effectiveness and insurance would repeat in 2 week or follow up in office in 1 week. Call if any problems.\par \par Efrain Briones D.O.\par Diplomat, American Board of Anesthesiology\par Diplomat, American Board of Pain Medicine\par Diplomat, American Board of Pain Management\par \par \par \par

## 2022-12-05 ENCOUNTER — APPOINTMENT (OUTPATIENT)
Dept: ORTHOPEDIC SURGERY | Facility: CLINIC | Age: 62
End: 2022-12-05

## 2022-12-05 NOTE — PROCEDURE
[FreeTextEntry3] : Myofascial injection with protease inhibitor concentrated plasma under Ultrasound Guidance\par  \par Preoperative diagnosis:        Left hamstring Trigger points/ Myalgia/ Myofascial pain under ultrasound guidance.             \par  \par Postoperative diagnosis:       Same   \par                                                 \par Procedure:     Harvesting protease inhibitor concentrated plasma. \par                           Left hamstring Trigger point injections with protease inhibitor concentrated plasma under ultrasound guidance.\par \par Physician:  Efrain Briones D.O. \par \par Anesthesia:                             Local\par \par Indications for Ultrasound:  Accurate percutaneous needle placement requires image guidance to prevent neuro/vascular injury or intravascular injection. \par \par Medical Necessity:                 Failure of conservative management\par  \par CONSENT: The possible complications including infection, bleeding, nerve damage, pneumothorax, discitis, death or failure of the procedure; though unusual, are theoretically possible. The patient was educated about the of the procedure and alternative therapies. All questions were answered, and the patient freely gave consent to proceed.\par  \par \par The patient was identified in the preoperative area and consent was obtained for autologous APIC injection. Using the 3 x 60cc syringes with a blunt needle attached from the APIC kit, 7ml was removed from ACD-A bag for each syringe. Patient was placed in a sitting position. The left antecubital fossa was prepped with ETOH. Using a 18 gauge needle and extension was attached to the 60 cc syringe containing 7 cc of ACD-A, 38 cc of the patient's blood was added to each 60 cc syringe to a total of 45cc. A blunt plastic cannula was attached to each syringe, the syringes were inverted several times to make sure there was adequate mixing of blood and anti-coagulated (ACD-A). Using the blunt tip of the cannula was entered to each of the 3 centrifuge tubes and 45ml of the anti-coagulated blood was injected carefully to each tube. The tubes were kept upright to prevent leakage. The 3 x 60cc syringe and blunt needle were disposed into a [employer] trash receptacle. The 3 centrifuged tubes were loaded using a balance technique and placed in the APIC centrifuge. Using the PPP setting, the start button was pressed and a 4 minute cycle was completed. The septum of each tube was wiped with ETOH. Using the plasma collection needle 15 cc of plasma above the buffy coat was collected from each tube into a 60cc syringe. The APIC concentration kit from the sterile packaging was removed. The concentration kit was placed onto the pump platform and "Enter" was pressed. The plasma collection needle was removed, the syringe containing. 45 ml. of plasma was attached to the blue port of the concertation bag. The 60 cc syringe was discarded. The tube was loaded into the pump head, the pump head lever was than locked. The pump start key was pressed. A 20 minute concentration process was completed. Immediately following the cycle completion, I engaged the white clamp on the waste bag tubing to prevent dilution using an alcohol pad, I wiped the blue port of the APIC Concentration Bag. A sterile syringe was than connected to the blue port of APIC Concentration Bag and the concentrated plasma was drawn into the syringe. The syringe containing the concentrated plasma was withdrawn. This sterile syringe containing 15 cc of protease inhibitor concentrated plasma of plasma was placed on the sterile syringe. \par \par Patient was placed in a prone position. The area of the insertion of the hamstring proximal. was prepped with Hibiclens. A time out was performed. Using Fluor guidance, the bony structures were IDed, using continuous ultrasound guidance for needle localization and palpation found 4 definitive trigger point in the left hamstring muscles and tensons. They were than pinpointed using a 4 x 25-gauge 1.5" needle. causing concordat pain. Then 15 cc protease inhibitor concentrated plasma was divided among the needles. The needles were removed intact. \par  \par Disposition: I have examined the patient and there are no new physical findings since the original presentation.  Sensory and motor function were intact. The patient met discharge criteria see nurses notes. The patient was discharged home with a .  The discharge instruction sheet was given to the patient.  \par  \par Comments: Depending on effectiveness can be repeated.  Follow up in office in 2-4 weeks. Call if any problems. \par  \par \par Efrain Briones D.O.\par Diplomat, American Board of Anesthesiology\par Diplomat, American Board of Pain Medicine\par Diplomat, American Board of Pain Management\par \par

## 2022-12-12 ENCOUNTER — APPOINTMENT (OUTPATIENT)
Dept: PAIN MANAGEMENT | Facility: CLINIC | Age: 62
End: 2022-12-12

## 2022-12-12 PROCEDURE — 76942 ECHO GUIDE FOR BIOPSY: CPT

## 2022-12-12 PROCEDURE — 20552 NJX 1/MLT TRIGGER POINT 1/2: CPT

## 2022-12-12 NOTE — PROCEDURE
[FreeTextEntry3] : Trigger Point Injection under Ultrasound Guidance\par \par Preoperative diagnosis: Trigger points/ Myalgia                      \par Postoperative diagnosis: Same  \par Procedure:  left hamstring Trigger point injections with ultrasound guidance.\par \par Trigger point injections, one or two muscles:\par \par Ultrasound Guidance:          \par                           \par Physician: Efrain Briones D.O.                                                                                             \par \par Indications for Ultrasound:  Accurate percutaneous needle placement requires image guidance to prevent neuro/vascular injury or intravascular injection, as well as to avoid lung injury.\par \par Medical Necessity:  Failure of conservative management\par \par Indication a palpable taut band of muscle with restriction of motion.  Noninvasive treatment modalities such as rest, heat/ice and stretching have been ineffective.  The pain has been present for greater than 3 months.\par \par CONSENT: The possible complications including infection, bleeding, nerve damage, pneumothorax (collapsed lung), hospital admission or failure of the procedure; though unusual, are theoretically possible. The patient was educated about the of the procedure and alternative therapies. All questions were answered and the patient freely gave consent to proceed.\par \par PROCEDURE NOTE:  After obtaining written consent, the patient was placed in the prone or sitting position. Areas of point tenderness in the muscles were identified and prepped with Hibiclens in a sterile fashion.   Using continuous ultrasound guidance for needle localization, multiple trigger points in the left hamstring were IDed. A mixture of 100cc of D5, 2cc of 2% lidocaine, with a .5cc of Bicarb 8.4 PH was made. Using 60cc of the mixture were injected into multiple trigger areas with a 27-gauge 1/2" needle using was used. All the needles were removed intact.  \par \par Ultrasound findings: No calcifications\par \par Complications: None. The patient tolerated the procedure well.\par \par Disposition: I have examined the patient and there are no new physical findings since the original presentation.  Sensory and motor function were intact. The patient met discharge criteria see nurses notes.\par \par Comment: Patient received about 20% relief from the 1st trigger point. Patient received about 50% relief from the 2nd TPI. 3rd TPI today, patient will follow up in 2 weeks. Call if any problems.\par \par Efrain Briones D.O.\par Diplomat, American Board of Anesthesiology\par Diplomat, American Board of Pain Medicine\par Diplomat, American Board of Pain Management\par \par \par \par

## 2023-01-03 ENCOUNTER — APPOINTMENT (OUTPATIENT)
Dept: PAIN MANAGEMENT | Facility: CLINIC | Age: 63
End: 2023-01-03
Payer: COMMERCIAL

## 2023-01-03 PROCEDURE — 20552 NJX 1/MLT TRIGGER POINT 1/2: CPT

## 2023-01-03 PROCEDURE — 76942 ECHO GUIDE FOR BIOPSY: CPT

## 2023-01-05 NOTE — PROCEDURE
[FreeTextEntry3] : Trigger Point Injection under Ultrasound Guidance\par \par Preoperative diagnosis: Trigger points/ Myalgia                      \par Postoperative diagnosis: Same  \par Procedure:  left hamstring Trigger point injections with ultrasound guidance.\par \par Trigger point injections, one or two muscles:\par \par Ultrasound Guidance:          \par                           \par Physician: Efrain Briones D.O.                                                                                             \par \par Indications for Ultrasound:  Accurate percutaneous needle placement requires image guidance to prevent neuro/vascular injury or intravascular injection, as well as to avoid lung injury.\par \par Medical Necessity:  Failure of conservative management\par \par Indication a palpable taut band of muscle with restriction of motion.  Noninvasive treatment modalities such as rest, heat/ice and stretching have been ineffective.  The pain has been present for greater than 3 months.\par \par CONSENT: The possible complications including infection, bleeding, nerve damage, pneumothorax (collapsed lung), hospital admission or failure of the procedure; though unusual, are theoretically possible. The patient was educated about the of the procedure and alternative therapies. All questions were answered and the patient freely gave consent to proceed.\par \par PROCEDURE NOTE:  After obtaining written consent, the patient was placed in the prone or sitting position. Areas of point tenderness in the muscles were identified and prepped with Hibiclens in a sterile fashion.   Using continuous ultrasound guidance for needle localization, multiple trigger points in the left hamstring were IDed. A mixture of 100cc of D5, 2cc of 2% lidocaine, with a .5cc of Bicarb 8.4 PH was made. Using 60 cc of the mixture were injected into multiple trigger areas with a 27-gauge 1/2" needle using was used. All the needles were removed intact.\par \par Ultrasound findings: No calcifications\par \par Complications: None. The patient tolerated the procedure well.\par \par Disposition: I have examined the patient and there are no new physical findings since the original presentation.  Sensory and motor function were intact. The patient met discharge criteria see nurses notes.\par \par Comment: Patient received about 20% relief from the 1st trigger point. Patient received about 50% relief from the 2nd TPI. Patient received about 50% relief from the 3rd trigger point. 4th TPI today, patient will follow up in 4 weeks. Call if any problems.\par \par Efrain Briones D.O.\par Diplomat, American Board of Anesthesiology\par Diplomat, American Board of Pain Medicine\par Diplomat, American Board of Pain Management\par \par \par \par

## 2023-01-18 ENCOUNTER — APPOINTMENT (OUTPATIENT)
Dept: ORTHOPEDIC SURGERY | Facility: CLINIC | Age: 63
End: 2023-01-18

## 2023-01-30 ENCOUNTER — APPOINTMENT (OUTPATIENT)
Dept: ORTHOPEDIC SURGERY | Facility: CLINIC | Age: 63
End: 2023-01-30

## 2023-01-30 NOTE — ASU PREOP CHECKLIST - PATIENT SENT TO
Last appointment this department: 11/29/2022  Next appointment this department: Visit date not found operating room

## 2023-01-31 ENCOUNTER — APPOINTMENT (OUTPATIENT)
Dept: PAIN MANAGEMENT | Facility: CLINIC | Age: 63
End: 2023-01-31
Payer: COMMERCIAL

## 2023-01-31 VITALS
HEART RATE: 86 BPM | HEIGHT: 70 IN | SYSTOLIC BLOOD PRESSURE: 132 MMHG | WEIGHT: 228 LBS | BODY MASS INDEX: 32.64 KG/M2 | DIASTOLIC BLOOD PRESSURE: 86 MMHG

## 2023-01-31 PROCEDURE — 99213 OFFICE O/P EST LOW 20 MIN: CPT

## 2023-01-31 NOTE — DISCUSSION/SUMMARY
[de-identified] : Mr. Christopher Jett Is a 62-year-old male with a chief complaint of left hamstring pain.  He notes that pain refers up into the hip.  He has had this pain for about 4 months following an injury which took place while exercising.  He notes pain has worsened in the last 2 months. He is status post left hamstring injection with fluoroscopic and ultrasound guidance. He notes that this procedure provided him with over 50% relief. He is most recently status post series of 4 trigger points, the most recent being on 1/3/23, in the left hamstring which provided him with again 50% relief. He notes he is able to walk 2 miles in the morning and continues to exercise. I advised the patient that he can consider consider A2M injections for the left hamstring if pain becomes more severe. All of this patient's questions were answered and he understood our conversation well. \par \par I, Nae Suazo, attest that this documentation has been prepared under the direction and in the presence of Provider Efrain Briones DO\par The documentation recorded by the scribe, in my presence, accurately reflects the service I personally performed, and the decisions made by me with my edits as appropriate.\par \par Best Regards, \par Efrain Briones D.FRANCIA. \par Diplomat, American Board of Anesthesiology \par Diplomat, American Board of Pain Medicine \par Diplomat, American Board of Pain Management

## 2023-01-31 NOTE — HISTORY OF PRESENT ILLNESS
[FreeTextEntry1] : HISTORY OF PRESENT ILLNESS: This is a 62 year old man complaining of left hamstring pain which extends up into the hip. The patient has had this pain for 4 months. The pain has worsened in the last 2 months. The pain started after an injury that took place while exercising. Patient describes pain as moderate to severe. During the last month the pain has been nearly constant with symptoms worsening in no typical pattern. Pain described as sharp, dull/aching, and throbbing.  Pain is not associated with numbness or weakness.  Pain is increased with sitting, walking and exercise.  Pain is decreased with lying down, standing in relaxation. Pain is not changed with coughing/sneezing and bowel movements. Bowel or bladder habits have not changed.\par \par Of note, patient was previously under the care of Dr. Gorman who referred him to pain management for consideration of a left hamstring injection. Patient has trialed a month a a half of physical therapy with mild relief. \par  \par ACTIVITIES: Patient could walk 10 blocks before the pain starts. Patient can sit 1-2 hours  before pain starts. Patient can stand 1 hour before pain starts. The patient seldom lays down because of pain. Patient uses no assistive walking device at this time. Patient has difficulty doing yard work or shopping, socializing with friends, participating in recreational activities and exercising at this time.\par \par PRIOR PAIN TREATMENTS:  Moderate relief with physical therapy, exercise, heat treatment, cold treatment and acupuncture\par \par PRIOR PAIN MEDICATIONS:  Naproxen and Xanax\par \par PRESENTING TODAY: In revisit encounter in regard to his improved left hamstring pain. He status post left hamstring injection on 10/12/22. He states this procedure provided him with over 50% relief. He is most recently status post series of 4 trigger points in the left hamstring which provided him with again 50% relief. He notes he is able to walk 2 miles in the morning and continues to exercise. \par \par Covid 19 - This in-office encounter has occurred during a Public Health Emergency (PHE). As required by law, due to the risk of respiratory-transmitted infectious diseases, our office provided additional materials, supplies and clinical staff to assist in keeping our patients, physicians and office staff safe during this health emergency.\par

## 2023-01-31 NOTE — PHYSICAL EXAM
[de-identified] : GENERAL APPEARANCE OF PATIENT IS WELL DEVELOPED, WELL NOURISHED, BODY HABITUS NORMAL, WELL GROOMED, NO DEFORMITIES NOTED. \par Head - Atraumatic and Normocephalic \par Eyes, Nose, and Throat: External inspection of ears and nose are normal overall without scars, lesions, or masses noted. Assessment of hearing is normal\par Neck-Examination of neck shows no masses, overall appearance is normal, neck is symmetric, tracheal position is midline, no crepitus is noted. Examination of thyroid shows no enlargement, tenderness or masses\par Respiratory- Assessment of respiratory effort shows no intercostal retractions, no use of accessory muscles, unlabored breathing, and normal diaphragmatic movement.\par Cardiovascular- Examination of extremities show no edema or varicosities\par Musculoskeletal. Examination of gait is not antalgic and station is normal\par Inspection and palpation of digits and nails shows no clubbing, cyanosis, nodules, drainage, fluctuance, petechiae\par \par • Spine – inspection and palpation shows no misalignment, asymmetry, crepitation, defects, tenderness, masses, effusions. ROM is normal without crepitation or contracture. No instability or subluxation or laxity is noted. No abnormal movements.\par \par \par • Neck- inspection and palpation shows no misalignment, asymmetry, crepitation, defects, tenderness, masses, effusions. ROM is normal without crepitation or contracture. No instability or subluxation or laxity is noted. No abnormal movements.\par \par \par • RUE- inspection and palpation shows no misalignment, asymmetry, crepitation, defects, tenderness, masses, effusions. ROM is normal without crepitation or contracture. No instability or subluxation or laxity is noted. No abnormal movements.\par \par \par • LUE- inspection and palpation shows no misalignment, asymmetry, crepitation, defects, tenderness, masses, effusions. ROM is normal without crepitation or contracture. No instability or subluxation or laxity is noted. No abnormal movements.\par \par \par • RLE- inspection and palpation shows no misalignment, asymmetry, crepitation, defects, tenderness, masses, effusions. ROM is normal without crepitation or contracture. No instability or subluxation or laxity is noted. No abnormal movements.\par \par \par • LLE- tenderness of the left hamstring to insertion at the pelvis. \par \par \par • Skin- Inspection of skin and subcutaneous tissue shows no rashes, lesions or ulcers. Palpation of skin and subcutaneous tissue shows no rashes, no indurations, subcutaneous nodules or tightening.\par \par \par • Abdomen- Nontender\par \par \par • Neurologic- CN 2-12 are grossly intact. No sensory or motor deficits in the upper and lower extremities. Adequate strength in upper and lower extremities \par \par \par • Psychiatric- Patient’s judgment and insight are intact. Oriented to time, place and person. Recent and remote memory intact.\par

## 2023-02-07 ENCOUNTER — APPOINTMENT (OUTPATIENT)
Dept: ORTHOPEDIC SURGERY | Facility: CLINIC | Age: 63
End: 2023-02-07
Payer: COMMERCIAL

## 2023-02-07 PROCEDURE — 99213 OFFICE O/P EST LOW 20 MIN: CPT

## 2023-02-07 NOTE — DISCUSSION/SUMMARY
[de-identified] : At this point he may do activities as tolerated, he will continue therapy exercises for the right knee, I will see him in 2 months for further evaluation for his right knee.\par \par Supervising physician:  Dr. Gorman.

## 2023-02-07 NOTE — PHYSICAL EXAM
[Right] : right knee [NL (0)] : extension 0 degrees [] : patient ambulates without assistive device [FreeTextEntry3] :   Surgical scar noted in the midline [FreeTextEntry8] :   Mild tenderness to palpation over the MCL.  [TWNoteComboBox7] : flexion 130 degrees

## 2023-02-07 NOTE — HISTORY OF PRESENT ILLNESS
[de-identified] : The patient is a 62-year-old male here for subsequent re-evaluation of his right knee.  He is status post quadriceps tendon repair as well as medial and lateral capsular repair on 08/31/2021.  His knee is doing well overall however he does get some pain, he points over the area the MCL and the medial tibial plateau as to where the pain is.  It is intermittent in nature.  He is happy with the way his knee feels.

## 2023-02-16 ENCOUNTER — APPOINTMENT (OUTPATIENT)
Dept: ORTHOPEDIC SURGERY | Facility: CLINIC | Age: 63
End: 2023-02-16
Payer: COMMERCIAL

## 2023-02-16 PROCEDURE — 99213 OFFICE O/P EST LOW 20 MIN: CPT

## 2023-02-16 NOTE — ASSESSMENT
[FreeTextEntry1] :  I think he has done quite well from the knee surgery  efforts on the shoulder with therapy  having quite successful      the hamstring  has  partially responded I do not think there is much arthritis to speak of in the hip joint is good that he has lost some weight I will see him in 3 months   I might still consider  PRP injection

## 2023-02-16 NOTE — REASON FOR VISIT
[FreeTextEntry2] : left shoulder and left hamstring\par  shoulder is doing better stronger doing home PT still some pain in the hamstring average would be 55% better weight is good to 127 lb he has had 4 pro low injections from pain management try to do some walking still has follow-up with pain management  some discussion about PRP

## 2023-02-16 NOTE — IMAGING
[de-identified] :  good neck motion no spasm\par \par Left shoulder mild impingement some crepitus good strength \par \par Right knee healed incision good motion mild weakness normal gait\par \par Left hip some pain at the ischium some pain with stretch back has some spasm no focal neurologic deficits\par \par X-rays taken today show mild arthritic changes of the hips left more than right

## 2023-03-02 ENCOUNTER — APPOINTMENT (OUTPATIENT)
Dept: PAIN MANAGEMENT | Facility: CLINIC | Age: 63
End: 2023-03-02

## 2023-03-07 ENCOUNTER — APPOINTMENT (OUTPATIENT)
Dept: PAIN MANAGEMENT | Facility: CLINIC | Age: 63
End: 2023-03-07
Payer: COMMERCIAL

## 2023-03-07 VITALS — HEIGHT: 70 IN | BODY MASS INDEX: 32.64 KG/M2 | WEIGHT: 228 LBS

## 2023-03-07 DIAGNOSIS — M79.18 MYALGIA, OTHER SITE: ICD-10-CM

## 2023-03-07 PROCEDURE — 99213 OFFICE O/P EST LOW 20 MIN: CPT

## 2023-03-07 NOTE — HISTORY OF PRESENT ILLNESS
[FreeTextEntry1] : HISTORY OF PRESENT ILLNESS: This is a 62 year old man complaining of left hamstring pain which extends up into the hip. The patient has had this pain for 4 months. The pain has worsened in the last 2 months. The pain started after an injury that took place while exercising. Patient describes pain as moderate to severe. During the last month the pain has been nearly constant with symptoms worsening in no typical pattern. Pain described as sharp, dull/aching, and throbbing.  Pain is not associated with numbness or weakness.  Pain is increased with sitting, walking and exercise.  Pain is decreased with lying down, standing in relaxation. Pain is not changed with coughing/sneezing and bowel movements. Bowel or bladder habits have not changed.\par \par Of note, patient was previously under the care of Dr. Gorman who referred him to pain management for consideration of a left hamstring injection. Patient has trialed a month a a half of physical therapy with mild relief. \par  \par ACTIVITIES: Patient could walk 10 blocks before the pain starts. Patient can sit 1-2 hours  before pain starts. Patient can stand 1 hour before pain starts. The patient seldom lays down because of pain. Patient uses no assistive walking device at this time. Patient has difficulty doing yard work or shopping, socializing with friends, participating in recreational activities and exercising at this time.\par \par PRIOR PAIN TREATMENTS:  Moderate relief with physical therapy, exercise, heat treatment, cold treatment and acupuncture\par \par PRIOR PAIN MEDICATIONS:  Naproxen and Xanax\par \par PRESENTING TODAY: In revisit encounter in regard to his improved left hamstring pain. He is most recently status post series of 4 trigger points in the left hamstring which provided him with again 50% relief. He notes he is able to walk 2 miles in the morning and continues to exercise. He notes on his better days, he is at 65-75% relief. \par \par Covid 19 - This in-office encounter has occurred during a Public Health Emergency (PHE). As required by law, due to the risk of respiratory-transmitted infectious diseases, our office provided additional materials, supplies and clinical staff to assist in keeping our patients, physicians and office staff safe during this health emergency.\par

## 2023-03-07 NOTE — ASSESSMENT
[FreeTextEntry1] : Mr. Christopher Jett Is a 62-year-old male with a chief complaint of left hamstring pain.  He notes that pain refers up into the hip.  He has had this pain for about 4 months following an injury which took place while exercising. He is status post left hamstring injection with fluoroscopic and ultrasound guidance. He notes that this procedure provided him with over 50% relief. He is most recently status post series of 4 trigger points, the most recent being on 1/3/23, in the left hamstring which provided him with again 50% relief. On his better days, he has 65-75% relief. He notes he is able to walk 2 miles in the morning and continues to exercise at home. I advised the patient that he can consider A2M injections for the left hamstring if pain becomes more severe after 8 weeks. The possibility of another series of trigger point injections also was discussed today in office. The patient will revisit the idea as well come 8 weeks in follow up.  All of this patient's questions were answered and he understood our conversation well. \par \par I, Aimee Gaxiola, attest that this documentation has been prepared under the direction and in the presence of Provider Kirk Dahl The documentation recorded by the scribe, in my presence, accurately reflects the service I personally performed, and the decisions made by me with my edits as appropriate.\par \par \par Best Regards,  \par \par \par Efrain Briones D.O.  \par \par Diplomat, American Board of Anesthesiology  \par \par Diplomat, American Board of Pain Medicine  \par \par Diplomat, American Board of Pain Management

## 2023-03-07 NOTE — PHYSICAL EXAM
[de-identified] : GENERAL APPEARANCE OF PATIENT IS WELL DEVELOPED, WELL NOURISHED, BODY HABITUS NORMAL, WELL GROOMED, NO DEFORMITIES NOTED. \par Head - Atraumatic and Normocephalic \par Eyes, Nose, and Throat: External inspection of ears and nose are normal overall without scars, lesions, or masses noted. Assessment of hearing is normal\par Neck-Examination of neck shows no masses, overall appearance is normal, neck is symmetric, tracheal position is midline, no crepitus is noted. Examination of thyroid shows no enlargement, tenderness or masses\par Respiratory- Assessment of respiratory effort shows no intercostal retractions, no use of accessory muscles, unlabored breathing, and normal diaphragmatic movement.\par Cardiovascular- Examination of extremities show no edema or varicosities\par Musculoskeletal. Examination of gait is not antalgic and station is normal\par Inspection and palpation of digits and nails shows no clubbing, cyanosis, nodules, drainage, fluctuance, petechiae\par \par • Spine – inspection and palpation shows no misalignment, asymmetry, crepitation, defects, tenderness, masses, effusions. ROM is normal without crepitation or contracture. No instability or subluxation or laxity is noted. No abnormal movements.\par \par \par • Neck- inspection and palpation shows no misalignment, asymmetry, crepitation, defects, tenderness, masses, effusions. ROM is normal without crepitation or contracture. No instability or subluxation or laxity is noted. No abnormal movements.\par \par \par • RUE- inspection and palpation shows no misalignment, asymmetry, crepitation, defects, tenderness, masses, effusions. ROM is normal without crepitation or contracture. No instability or subluxation or laxity is noted. No abnormal movements.\par \par \par • LUE- inspection and palpation shows no misalignment, asymmetry, crepitation, defects, tenderness, masses, effusions. ROM is normal without crepitation or contracture. No instability or subluxation or laxity is noted. No abnormal movements.\par \par \par • RLE- inspection and palpation shows no misalignment, asymmetry, crepitation, defects, tenderness, masses, effusions. ROM is normal without crepitation or contracture. No instability or subluxation or laxity is noted. No abnormal movements.\par \par \par • LLE- tenderness of the left hamstring to insertion at the pelvis. \par \par \par • Skin- Inspection of skin and subcutaneous tissue shows no rashes, lesions or ulcers. Palpation of skin and subcutaneous tissue shows no rashes, no indurations, subcutaneous nodules or tightening.\par \par \par • Abdomen- Nontender\par \par \par • Neurologic- CN 2-12 are grossly intact. No sensory or motor deficits in the upper and lower extremities. Adequate strength in upper and lower extremities \par \par \par • Psychiatric- Patient’s judgment and insight are intact. Oriented to time, place and person. Recent and remote memory intact.\par

## 2023-04-11 ENCOUNTER — APPOINTMENT (OUTPATIENT)
Dept: ORTHOPEDIC SURGERY | Facility: CLINIC | Age: 63
End: 2023-04-11
Payer: COMMERCIAL

## 2023-04-11 PROCEDURE — 99213 OFFICE O/P EST LOW 20 MIN: CPT

## 2023-04-11 NOTE — DISCUSSION/SUMMARY
[de-identified] : I reviewed the x-ray findings with the patient.  He may continue activities as tolerated.  I will see him back in 3 months for further evaluation.\par \par Supervising Physician: Dr. Gorman

## 2023-04-11 NOTE — DATA REVIEWED
[Right] : of the right [Knee] : knee [FreeTextEntry1] : 3 views of the right knee were obtained in the office and show: Well-preserved medial lateral compartments.  No fractures noted.  On the lateral view there are calcifications noted over the distal quadricep tendon.

## 2023-04-11 NOTE — PHYSICAL EXAM
[Right] : right knee [NL (0)] : extension 0 degrees [] : non-antalgic [FreeTextEntry3] :   Surgical scar noted in the midline [FreeTextEntry8] :   Mild tenderness to palpation over the MCL.  [TWNoteComboBox7] : flexion 130 degrees

## 2023-04-11 NOTE — HISTORY OF PRESENT ILLNESS
[de-identified] : The patient is a 62-year-old male here for subsequent re-evaluation of his right knee.  He is status post quadriceps tendon repair as well as medial and lateral capsular repair on 08/31/2021.  His knee is doing well overall however he still gets some pain, he points over the medial tibial plateau as to where the pain is.  He states he walks a couple miles a day and he is back to rowing.  He states his knee feels very strong.

## 2023-04-11 NOTE — IMAGING
[de-identified] : Physical exam of the right knee: No effusion, no erythema, no ecchymosis.  Surgical scar noted in the midline.  He can straight leg raise.  He can fully extend the knee, flexion to 130 degrees.  No tenderness to patient over the medial or lateral joint lines.  He has tenderness to palpation over the medial tibial plateau.  No tenderness to palpation over the lateral tibial plateau.  Intact to light touch and sensation.  Nonantalgic gait.

## 2023-05-31 ENCOUNTER — APPOINTMENT (OUTPATIENT)
Dept: ORTHOPEDIC SURGERY | Facility: CLINIC | Age: 63
End: 2023-05-31
Payer: COMMERCIAL

## 2023-05-31 PROCEDURE — 99213 OFFICE O/P EST LOW 20 MIN: CPT

## 2023-05-31 NOTE — ASSESSMENT
[FreeTextEntry1] :  I think he has done quite well from the knee surgery and his  efforts on theleft  shoulder with therapy  having been quite successful      the hamstring  has  finally responded I do not think there is much arthritis to speak of in the hip joint is good that he has lost some weight I will see him in 3 months    deferred on any diagnostics to the right shoulder recommended taking the Naprosyn on a more regular basis did not see anything significantly wrong with his gait pattern today

## 2023-05-31 NOTE — IMAGING
[de-identified] :  good neck motion no spasm\par \par Left shoulder mild impingement some crepitus good strength \par  right shoulder good motion mild tenderness deltoid mild tenderness around the AC joint\par \par Right knee healed incision good motion mild weakness normal gait\par \par Left hip some pain at the ischium some pain with stretch back has some spasm no focal neurologic deficits\par \par X-rays taken today show mild arthritic changes of the hips left more than right\par  gait today appears non antalgic

## 2023-05-31 NOTE — REASON FOR VISIT
[FreeTextEntry2] : left shoulder left hamstring\par Right shoulder recently in the deltoid and tip of the shoulder    also concerned with an awkward gait pattern\par  left shoulder doing well as be careful raising it above 90¦ doing some exercises in the gym losing weight now 218 lb occasionally taking the Naprosyn hamstring is also doing better still some discomfort proximally\par  no recent injections by pain management

## 2023-06-12 ENCOUNTER — APPOINTMENT (OUTPATIENT)
Dept: PAIN MANAGEMENT | Facility: CLINIC | Age: 63
End: 2023-06-12

## 2023-06-20 ENCOUNTER — NON-APPOINTMENT (OUTPATIENT)
Age: 63
End: 2023-06-20

## 2023-06-22 ENCOUNTER — APPOINTMENT (OUTPATIENT)
Dept: MRI IMAGING | Facility: CLINIC | Age: 63
End: 2023-06-22
Payer: COMMERCIAL

## 2023-06-22 PROCEDURE — 73721 MRI JNT OF LWR EXTRE W/O DYE: CPT | Mod: RT

## 2023-06-27 ENCOUNTER — APPOINTMENT (OUTPATIENT)
Dept: ORTHOPEDIC SURGERY | Facility: CLINIC | Age: 63
End: 2023-06-27
Payer: COMMERCIAL

## 2023-06-27 PROCEDURE — 99213 OFFICE O/P EST LOW 20 MIN: CPT | Mod: 25

## 2023-06-27 PROCEDURE — 20610 DRAIN/INJ JOINT/BURSA W/O US: CPT | Mod: RT

## 2023-06-27 NOTE — HISTORY OF PRESENT ILLNESS
[de-identified] : The patient is a 62-year-old male here for a subsequent reevaluation of his right knee.  He is status post right quadriceps tendon repair August 2021.  He still having pain just distal to the medial joint line of the right knee.  MRI came back showing a possible recurrent mild partial tear of the distal quadriceps tendon mild bursitis and patella tendinopathy medial meniscal degeneration but no tearing.

## 2023-06-27 NOTE — DISCUSSION/SUMMARY
[de-identified] : At this point I recommend a pes anserine bursa injection.  The patient agreed.  The area was cleansed and prepped in usual sterile fashion and the Pes anserine bursa was injected with 2 cc lidocaine, 1 cc dexamethasone.  He will avoid high-impact activities for the next 24 hours.  I will see him back in 6 weeks for further evaluation.  He will continue home therapy exercises for the right knee.

## 2023-06-27 NOTE — IMAGING
[de-identified] : Physical exam of the right knee: No effusion, no erythema, no ecchymosis.  Good range of motion with flexion extension of the right knee.  Tenderness to palpation over the Pes anserine and just distal to that.  No medial or lateral joint line tenderness to palpation.  No tenderness palpation over the anterior aspect of the right knee.

## 2023-06-27 NOTE — PROCEDURE
[Large Joint Injection] : Large joint injection [Right] : of the right [Other: ____] : [unfilled] [___ cc    1%] : Lidocaine ~Vcc of 1%  [___ cc    4mg] : Dexamethasone (Decadron) ~Vcc of 4 mg  [Risks, benefits, alternatives discussed / Verbal consent obtained] : the risks benefits, and alternatives have been discussed, and verbal consent was obtained

## 2023-08-01 ENCOUNTER — APPOINTMENT (OUTPATIENT)
Dept: ORTHOPEDIC SURGERY | Facility: CLINIC | Age: 63
End: 2023-08-01
Payer: COMMERCIAL

## 2023-08-01 PROCEDURE — 99213 OFFICE O/P EST LOW 20 MIN: CPT

## 2023-08-01 NOTE — DISCUSSION/SUMMARY
[de-identified] : At this point he is doing very well, he will continue the home therapy exercises.  I will see him back in 7 weeks for further evaluation.

## 2023-08-01 NOTE — HISTORY OF PRESENT ILLNESS
[de-identified] : The patient is a 63 year-old male here for a subsequent reevaluation of his right knee.  He is status post right quadriceps tendon repair August 2021.  His knee is doing very well.  He obtained a lateral relief from the Pes anserine injection we did on his previous visit here.  He is able to jog for a bit on the treadmill.

## 2023-08-01 NOTE — IMAGING
[de-identified] : Physical exam of the right knee: No effusion, no erythema, no ecchymosis.  Good range of motion with flexion extension of the right knee.  No tenderness to palpation over the Pes anserine and just distal to that.  No medial or lateral joint line tenderness to palpation.  No tenderness palpation over the anterior aspect of the right knee.  Nonantalgic gait.

## 2023-09-06 ENCOUNTER — APPOINTMENT (OUTPATIENT)
Dept: ORTHOPEDIC SURGERY | Facility: CLINIC | Age: 63
End: 2023-09-06

## 2023-09-27 ENCOUNTER — APPOINTMENT (OUTPATIENT)
Dept: ORTHOPEDIC SURGERY | Facility: CLINIC | Age: 63
End: 2023-09-27
Payer: COMMERCIAL

## 2023-09-27 PROCEDURE — 99213 OFFICE O/P EST LOW 20 MIN: CPT

## 2023-10-21 ENCOUNTER — RX RENEWAL (OUTPATIENT)
Age: 63
End: 2023-10-21

## 2023-12-12 ENCOUNTER — APPOINTMENT (OUTPATIENT)
Dept: ORTHOPEDIC SURGERY | Facility: CLINIC | Age: 63
End: 2023-12-12
Payer: COMMERCIAL

## 2023-12-12 DIAGNOSIS — M25.561 PAIN IN RIGHT KNEE: ICD-10-CM

## 2023-12-12 PROCEDURE — 99213 OFFICE O/P EST LOW 20 MIN: CPT

## 2023-12-24 ENCOUNTER — RX RENEWAL (OUTPATIENT)
Age: 63
End: 2023-12-24

## 2023-12-27 ENCOUNTER — APPOINTMENT (OUTPATIENT)
Dept: ORTHOPEDIC SURGERY | Facility: CLINIC | Age: 63
End: 2023-12-27

## 2024-01-08 ENCOUNTER — APPOINTMENT (OUTPATIENT)
Dept: ORTHOPEDIC SURGERY | Facility: CLINIC | Age: 64
End: 2024-01-08
Payer: COMMERCIAL

## 2024-01-08 PROCEDURE — 99213 OFFICE O/P EST LOW 20 MIN: CPT | Mod: 25

## 2024-01-08 PROCEDURE — 20610 DRAIN/INJ JOINT/BURSA W/O US: CPT | Mod: RT

## 2024-01-08 NOTE — IMAGING
[de-identified] : Physical exam of the right knee: No effusion, no erythema, no ecchymosis.  Good range of motion with flexion extension of the right knee.  Tenderness to palpation over the Pes anserine and just distal to that.  No medial or lateral joint line tenderness to palpation.  No tenderness palpation over the anterior aspect of the right knee.

## 2024-01-08 NOTE — HISTORY OF PRESENT ILLNESS
[de-identified] : The patient is a 63-year-old male here for a subsequent reevaluation of his right knee.  He is status post right quadriceps tendon repair August 2021.  His knee is doing well, he reports pain in the area of the Pes anserine.  He had an injection of the pes anserine in June 2023 and obtained excellent relief from the injection.

## 2024-01-08 NOTE — DISCUSSION/SUMMARY
[de-identified] : At this point I recommend a pes anserine bursa injection.  The patient agreed.  The area was cleansed and prepped in usual sterile fashion and the Pes anserine bursa was injected with 2 cc lidocaine, 1 cc dexamethasone.  He will avoid high-impact activities for the next 24 hours.  I will see him back in 2 months for further evaluation.  He will continue home therapy exercises for the right knee.

## 2024-02-20 ENCOUNTER — APPOINTMENT (OUTPATIENT)
Dept: ORTHOPEDIC SURGERY | Facility: CLINIC | Age: 64
End: 2024-02-20

## 2024-03-11 ENCOUNTER — APPOINTMENT (OUTPATIENT)
Dept: ORTHOPEDIC SURGERY | Facility: CLINIC | Age: 64
End: 2024-03-11
Payer: COMMERCIAL

## 2024-03-11 DIAGNOSIS — S76.111D STRAIN OF RIGHT QUADRICEPS MUSCLE, FASCIA AND TENDON, SUBSEQUENT ENCOUNTER: ICD-10-CM

## 2024-03-11 PROCEDURE — 99213 OFFICE O/P EST LOW 20 MIN: CPT

## 2024-03-11 NOTE — DISCUSSION/SUMMARY
[de-identified] : At this point I recommend he continues with home therapy exercises for his right knee.  We discussed a repeat injection for the Pes anserine bursitis, he would like to hold off on that.  I will see him back in 2 months for further evaluation.  If his symptoms worsen prior to that he will call me sooner.

## 2024-03-11 NOTE — IMAGING
[de-identified] : Physical exam of the right knee: No effusion, no erythema, no ecchymosis.  Good range of motion with flexion extension of the right knee.  Mild tenderness to palpation over the pes anserine and just distal to that.  No medial or lateral joint line tenderness to palpation.  No tenderness palpation over the anterior aspect of the right knee.  Intact to light touch, nonantalgic gait.

## 2024-03-11 NOTE — HISTORY OF PRESENT ILLNESS
[de-identified] : The patient is a 63-year-old male here for a subsequent reevaluation of his right knee.  He is status post right quadriceps tendon repair August 2021.  His knee is doing well, he reports that he still has pain in the area of the Pes anserine.  He had an injection of the pes anserine at his previous office visit here in January 2024 and obtained good relief from the injection however not as good as the first injection.  He still walks 2 to 3 miles a day.  He states that pain in the area pes anserine bursa is manageable.

## 2024-04-01 ENCOUNTER — APPOINTMENT (OUTPATIENT)
Dept: ORTHOPEDIC SURGERY | Facility: CLINIC | Age: 64
End: 2024-04-01
Payer: COMMERCIAL

## 2024-04-01 DIAGNOSIS — S76.312D STRAIN OF MUSCLE, FASCIA AND TENDON OF THE POSTERIOR MUSCLE GROUP AT THIGH LEVEL, LEFT THIGH, SUBSEQUENT ENCOUNTER: ICD-10-CM

## 2024-04-01 DIAGNOSIS — M75.122 COMPLETE ROTATOR CUFF TEAR OR RUPTURE OF LEFT SHOULDER, NOT SPECIFIED AS TRAUMATIC: ICD-10-CM

## 2024-04-01 DIAGNOSIS — M77.8 OTHER ENTHESOPATHIES, NOT ELSEWHERE CLASSIFIED: ICD-10-CM

## 2024-04-01 DIAGNOSIS — G57.02 LESION OF SCIATIC NERVE, LEFT LOWER LIMB: ICD-10-CM

## 2024-04-01 PROCEDURE — 99213 OFFICE O/P EST LOW 20 MIN: CPT

## 2024-04-01 NOTE — IMAGING
[de-identified] : Physical exam of the right knee: No effusion, no erythema, no ecchymosis.  Good range of motion with flexion extension of the right knee.  Mild tenderness to palpation over the pes anserine and just distal to that.  No medial or lateral joint line tenderness to palpation.  No tenderness palpation over the anterior aspect of the right knee.  Intact to light touch, nonantalgic gait.

## 2024-04-01 NOTE — REASON FOR VISIT
[FreeTextEntry2] : Patient is here for left knee/hamstring pain  mild left shoulder pain taking  naprosyn qD walking daily  last month started with some hamstring pain resumed home PT

## 2024-04-01 NOTE — ASSESSMENT
[FreeTextEntry1] :  I think he has done quite well from the knee surgery and his efforts on the left shoulder with therapy having been quite successful the hamstring  I do not think there is much arthritis to speak of in the hip joint is good  I will see him in 3 months deferred on any diagnostics to the right shoulder recommended taking the Naprosyn on a more regular basis

## 2024-04-22 ENCOUNTER — RX RENEWAL (OUTPATIENT)
Age: 64
End: 2024-04-22

## 2024-04-22 RX ORDER — NAPROXEN 500 MG/1
500 TABLET ORAL
Qty: 60 | Refills: 2 | Status: ACTIVE | COMMUNITY
Start: 2023-05-31 | End: 1900-01-01

## 2024-05-06 ENCOUNTER — APPOINTMENT (OUTPATIENT)
Dept: ORTHOPEDIC SURGERY | Facility: CLINIC | Age: 64
End: 2024-05-06

## 2024-07-15 ENCOUNTER — APPOINTMENT (OUTPATIENT)
Dept: ORTHOPEDIC SURGERY | Facility: CLINIC | Age: 64
End: 2024-07-15

## 2024-09-11 ENCOUNTER — RX RENEWAL (OUTPATIENT)
Age: 64
End: 2024-09-11

## 2024-10-07 ENCOUNTER — APPOINTMENT (OUTPATIENT)
Dept: ORTHOPEDIC SURGERY | Facility: CLINIC | Age: 64
End: 2024-10-07